# Patient Record
Sex: FEMALE | Race: WHITE | Employment: OTHER | URBAN - METROPOLITAN AREA
[De-identification: names, ages, dates, MRNs, and addresses within clinical notes are randomized per-mention and may not be internally consistent; named-entity substitution may affect disease eponyms.]

---

## 2019-07-15 ENCOUNTER — HOSPITAL ENCOUNTER (OUTPATIENT)
Dept: PHYSICAL THERAPY | Age: 59
Discharge: HOME | End: 2019-07-15
Payer: COMMERCIAL

## 2019-07-15 PROCEDURE — 97162 PT EVAL MOD COMPLEX 30 MIN: CPT

## 2019-07-15 PROCEDURE — 97112 NEUROMUSCULAR REEDUCATION: CPT

## 2019-07-15 NOTE — PROGRESS NOTES
[address] [facility] AT [facility]  [address] [facility] [address] [address],[address], Sondra hopkins, Olga Chamorro  Phone [phone]  Fax [phone] / [address] [address] PHYSICAL THERAPY SERVICES  Patient Name: Kavon Bucio : 1960   Medical   Diagnosis: Gait instability [R26.81]  Vertigo [R42] Treatment Diagnosis: Impaired Balance, Gait Instability   Onset Date: 2018     Referral Source: Johana Ochoa MD [facility]): 7/15/2019   Prior Hospitalization: See medical history Provider #: [id number]   Prior Level of Function: Functionally I   Comorbidities: Asthma, hypertension, hx malignant neoplasm, thyroid disorder, hx of migraines, hx Right ankle surgery with hardware, hx umbilical hernia repair, hearing loss, bilateral carpal tunnel   Medications: Verified on Patient Summary List   The Plan of Care and following information is based on the information from the initial evaluation.   =================================================================================  Assessment / key information:  Patient is 63 yo female who presents to clinic due to gait instability associated with vertigo and Left acoustic neuroma. Patient s/p surgery to remove Left Acoustic Neuroma 19. \"I feel like I am in perpetual buzzed mode\" and \"I get tired so fast\". Patient's primary c/o is that she is unable to walk straight, fascial muscles easily fatigue, she gets tired eating, impaired eyesight, photosensitivity and sensitive to loud noises. Patient reports that it is hard to focus and that it takes longer to focus on an object. Patient works as a  and current difficulties involve speaking clearly and driving to properties. Patient also presents with decreased balance with narrow base of support, likely associated with previous ankle surgeries and hardware.  Clinical findings indicate impaired vestibular occular reflex, impaired balance, weakness and decreased of facial muscles, hearing loss in L ear indicating both facial and vestibulocochlear nerve involvement s/p effects of surgery. Functional impairments include impaired ability to drive, difficulty eating, unsteadiness of gait.      Clinical Findings:    Cervical Rotation: WFL    Observation: scar around L ear, 2 scabs but otherwise healing well    Balance:   Standing Feet Together: EO 30, EC 30   Rhomberg Instep: EO 30 sec bilateral, EC R foot forward 11 sec, L foot forward 27 sec   Rhomberg Bunion: EO R foot forward 30 sec, L foot forward 26 sec, EC R 3 sec, L 5 sec   Sharpened Rhomberg: R foot forward 6 sec, L foot forward 3 sec    Oculomotor Tests:   Occular ROM: WFL Bilateral   Spontaneous Nystagmus: (+) L   Gaze Holding Nystagmus:(-)   Smooth Pursuit: mild/slight catchup nystagmus L   Saccades: (-)   VOR Slow: (-)   VOR Fast: (+)    Weakness of facial muscles, cranial nerve VII    FOTO: 70/100     =================================================================================  Eval Complexity: History: HIGH Complexity :3+ comorbidities / personal factors will impact the outcome/ POC Exam:HIGH Complexity : 4+ Standardized tests and measures addressing body structure, function, activity limitation and / or participation in recreation  Presentation: MEDIUM Complexity : Evolving with changing characteristics  Clinical Decision Making:MEDIUM Complexity : FOTO score of 26-74Overall Complexity:MEDIUM  Problem List: decrease strength, impaired gait/ balance, decrease ADL/ functional abilitiies and decrease activity tolerance   Treatment Plan may include any combination of the following: Therapeutic exercise, Therapeutic activities, Neuromuscular re-education, Physical agent/modality, Gait/balance training, Manual therapy and Patient education  Patient / Family readiness to learn indicated by: asking questions  Persons(s) to be included in education: patient (P)  Barriers to Learning/Limitations: None  Measures taken:    Patient Goal (s): \"get my balance back and my eyesight better. \"    Patient self reported health status: good  Rehabilitation Potential: good   Short Term Goals: To be accomplished in  3  treatments:  1. Patient will be independent with HEP to improve balance and strength in order to improve performance of ADLs.  Long Term Goals: To be accomplished in  8-12  treatments:  1. Patient will be able to perform 1 min of VOR Fast in standing with no dizziness or LOB to indicate improved balance as required to return to driving. 2. Patient will be able to demonstrate symmetric smile, grin, and frown with L side equal to R to indicate improved facial muscles and improved ability to eat. 3. Patient will be able to perform sharpened Rhomberg with EO on foam for 30 sec bilaterally to indicate improved balance and stabilize gait. 4. Patient will report FOTO score of 76/100 to indicate improved functional mobility. Frequency / Duration:   Patient to be seen  2  times per week for 8-12  treatments:  Patient / Caregiver education and instruction: exercises  G-Codes (GP): ELISE  Therapist Signature: Indira Cadet Date: 7/96/7976   Certification Period: NA Time: 9:44 AM   ===========================================================================================  I certify that the above Physical Therapy Services are being furnished while the patient is under my care. I agree with the treatment plan and certify that this therapy is necessary. Physician Signature:        Date:       Time:     Please sign and return to [facility] or you may fax the signed copy to [phone]. Thank you.

## 2019-07-15 NOTE — PROGRESS NOTES
PT GAIT INSTABILITY/VESTIBULAR EVAL AND DAILY TREATMENT NOTE     Patient Name: Guerita Anderson  Date:7/15/2019  : 1960  [x]  Patient  Verified  Payor: Thea Nichols / Plan: [place] [employer]ITATED PT / Product Type: Commerical /    In time:1031  Out time:1118  Total Treatment Time (min): 52  Visit #: 1 of     Treatment Area: Gait instability [R26.81]  Vertigo [R42]    SUBJECTIVE  Pain Level IN: (0-10 scale): 0/10   Pain Level OUT: (0-10 scale) post treatment: 0/10    Any medication changes, allergies to medications, adverse drug reactions, diagnosis change, or new procedure performed?: [x] No    [] Yes (see summary sheet for update)  Subjective functional status/changes:   [] No changes reported  Patient is 63 yo female who presents to clinic due to gait instability associated with vertigo and Left acoustic neuroma. Patient s/p surgery to remove Left Acoustic Neuroma 19. \"I feel like I am in perpetual buzzed mode\" and \"I get tired so fast\". Patient's primary c/o is that she is unable to walk straight, fascial muscles easily fatigue, she gets tired eating, impaired eyesight, photosensitivity and sensitive to loud noises. Patient reports that it is hard to focus and that it takes longer to focus on an object. Patient works as a  and current difficulties involve speaking clearly and driving to properties. OBJECTIVE   22 min Eval    25 min Neuromuscular Re-education:  [x] See flow sheet : also saccades, smooth pursuit   Rationale: improve coordination, improve balance and increase proprioception to improve the patients ability to ambulate normally with decreased risk of falls, and return to driving.         With   [] TE   [] TA   [] neuro   [] other: Patient Education: [x] Review HEP  - VOR, VVI  [] Progressed/Changed HEP based on:   [] positioning   [] body mechanics   [] transfers   [] heat/ice application    [] Graston Education: Explained the effects and benefits of Graston Technique therapy including potential for post treatment soreness and bruising. [] Other:           Objective/Functional Measures:  Cervical Rotation: WFL     Observation: scar around L ear, 2 scabs but otherwise healing well     Balance:                 Standing Feet Together: EO 30, EC 30                 Rhomberg Instep: EO 30 sec bilateral, EC R foot forward 11 sec, L foot forward 27 sec                 Rhomberg Bunion: EO R foot forward 30 sec, L foot forward 26 sec, EC R 3 sec, L 5 sec                 Sharpened Rhomberg: R foot forward 6 sec, L foot forward 3 sec    Oculomotor Tests:                 Occular ROM: WFL Bilateral                 Spontaneous Nystagmus: (+) L                 Gaze Holding Nystagmus:(-)                 Smooth Pursuit: mild/slight catchup nystagmus L                 Saccades: (-)                 VOR Slow: (-)                 VOR Fast: (+)     Weakness of facial muscles, cranial nerve VII    FOTO: 70/100    ASSESSMENT/Changes in Function:  Patient is 63 yo female who presents to clinic due to gait instability associated with vertigo and Left acoustic neuroma. Patient s/p surgery to remove Left Acoustic Neuroma 6/14/19. \"I feel like I am in perpetual buzzed mode\" and \"I get tired so fast\". Patient's primary c/o is that she is unable to walk straight, fascial muscles easily fatigue, she gets tired eating, impaired eyesight, photosensitivity and sensitive to loud noises. Patient reports that it is hard to focus and that it takes longer to focus on an object. Patient works as a  and current difficulties involve speaking clearly and driving to properties. Patient also presents with decreased balance with narrow base of support, likely associated with previous ankle surgeries and hardware.  Clinical findings indicate impaired vestibular occular reflex, impaired balance, weakness and decreased of facial muscles, hearing loss in L ear indicating both facial and vestibulocochlear nerve involvement s/p effects of surgery. Functional impairments include impaired ability to drive, difficulty eating, unsteadiness of gait. Patient will continue to benefit from skilled PT services to modify and progress therapeutic interventions, address functional mobility deficits, address ROM deficits, address strength deficits, analyze and address soft tissue restrictions, analyze and cue movement patterns and address imbalance/dizziness to attain remaining goals. [x]  See Plan of Care  []  See progress note/recertification  []  See Discharge Summary         Progress towards goals / Updated goals: · Short Term Goals: To be accomplished in  3  treatments:  1. Patient will be independent with HEP to improve balance and strength in order to improve performance of ADLs. · Long Term Goals: To be accomplished in  8-12  treatments:  1. Patient will be able to perform 1 min of VOR Fast in standing with no dizziness or LOB to indicate improved balance as required to return to driving. 2. Patient will be able to demonstrate symmetric smile, grin, and frown with L side equal to R to indicate improved facial muscles and improved ability to eat. 3. Patient will be able to perform sharpened Rhomberg with EO on foam for 30 sec bilaterally to indicate improved balance and stabilize gait.   4. Patient will report FOTO score of 76/100 to indicate improved functional mobility    PLAN  []  Upgrade activities as tolerated     []  Continue plan of care  []  Update interventions per flow sheet       []  Discharge due to:_  [x]  Other:_  Initiate plan of care    Leda Cox, PT, DPT   7/15/2019  2:39 PM    Future Appointments   Date Time Provider Seema Guadalupe   7/19/2019  8:30 AM Frank Keyes, PT [facility]   7/22/2019  8:30 AM Fabian Barone PT [facility]   7/25/2019 10:30 AM [facility]   7/30/2019 11:30 AM Frank Keyes, PT [facility]   8/1/2019 10:00 AM Shaneka Dumont [facility]   8/6/2019 10:00 AM Patrick Partida, PT [facility]   8/8/2019 10:00 AM Sheree Wise, PTA [facility]   8/13/2019 10:00 AM Sheree Wise, PTA [facility]   8/15/2019 10:00 AM Sheree Wise, PTA [facility]   8/20/2019 10:00 AM Sheree Wise, PTA [facility]   8/22/2019 10:00 AM Patrick Partida, PT [facility]   8/27/2019 10:00 AM [facility]   8/29/2019 10:00 AM [facility] Partida, [facility]

## 2019-07-19 ENCOUNTER — HOSPITAL ENCOUNTER (OUTPATIENT)
Dept: PHYSICAL THERAPY | Age: 59
Discharge: HOME | End: 2019-07-19
Payer: COMMERCIAL

## 2019-07-19 PROCEDURE — 97112 NEUROMUSCULAR REEDUCATION: CPT

## 2019-07-19 PROCEDURE — 97110 THERAPEUTIC EXERCISES: CPT

## 2019-07-19 NOTE — PROGRESS NOTES
PT DAILY TREATMENT NOTE     Patient Name: Mary Cui  Date:2019  : 1960  [x]  Patient  Verified  Payor: Krista Qureshi / Plan: [address] [place] [address] Rd PT / Product Type: Commerical /    In time:835  Out time:910  Total Treatment Time (min): 35  Visit #: 2 of     Treatment Area: Gait instability [R26.81]  Vertigo [R42]    SUBJECTIVE  Pain Level (0-10 scale): no c/o pain. Reports 4/10 on dizziness scale. Any medication changes, allergies to medications, adverse drug reactions, diagnosis change, or new procedure performed?: [x] No    [] Yes (see summary sheet for update)  Subjective functional status/changes:   [] No changes reported  I was pretty sick when I left here the last time into the next day. Just really dizzy and nauseous. OBJECTIVE  10 min Therapeutic Exercise:  [x] See flow sheet : Issued seated VOR x 1 vertically and horizontally   Rationale: improve balance to improve the patients ability to tolerate daily activities. 25 min Neuromuscular Re-education:  [x]  See flow sheet : See below   Rationale: improve balance and increase proprioception  to improve the patients ability to tolerate daily activities. min Patient Education: [x] Review HEP    [] Progressed/Changed HEP based on:   [] positioning   [] body mechanics   [] transfers   [] heat/ice application        Other Objective/Functional Measures:   Performed 1 minute of VOR x 1 in sitting with cervical rotation of about 25* at comfortable speed, then one minute of VOR x 1 with cervical flexion / extension   Habituation exercises in sitting x 10 times to each side. To the RIght is more challenging than to the Left.    Standing static balance:  Rhomberg on Floor Eyes Open 30 seconds  Rhomberg EO Foam 30 seconds  Rhomberg EC Floor  30 seconds w visible sway  Rhomberg EC Foam  2 seconds x 3 trials    Pain Level (0-10 scale) post treatment: 0/10    ASSESSMENT/Changes in Function: Patient tolerated first treatment session fair with frequent rest breaks required due to c/o dizziness. Patient will continue to benefit from skilled PT services to modify and progress therapeutic interventions and address imbalance/dizziness to attain remaining goals.      []  See Plan of Care  []  See progress note/recertification  []  See Discharge Summary         Progress towards goals / Updated goals:  First session following eval,  reassess goals NV    PLAN  []  Upgrade activities as tolerated     [x]  Continue plan of care  []  Update interventions per flow sheet       []  Discharge due to:_  [x]  Other:  Monitor effects of first treatment KARLIE Mcmanus, PT 7/19/2019  8:42 AM

## 2019-07-22 ENCOUNTER — HOSPITAL ENCOUNTER (OUTPATIENT)
Dept: PHYSICAL THERAPY | Age: 59
Discharge: HOME | End: 2019-07-22
Payer: COMMERCIAL

## 2019-07-22 PROCEDURE — 97112 NEUROMUSCULAR REEDUCATION: CPT | Performed by: PHYSICAL THERAPIST

## 2019-07-22 NOTE — PROGRESS NOTES
PT DAILY TREATMENT NOTE     Patient Name: Grace Jordanian  Date:2019  : 1960  [x]  Patient  Verified  Payor: Liberty Chilel / Plan: [address] PT / Product Type: Commerical /    In time: 838am  Out time: 923  Total Treatment Time (min): 45  Visit #: 3 of     Treatment Area: Gait instability [R26.81]  Vertigo [R42]    SUBJECTIVE  Pain Level (0-10 scale): no c/o pain. Reports 3-4/10 on dizziness scale. Any medication changes, allergies to medications, adverse drug reactions, diagnosis change, or new procedure performed?: [x] No    [] Yes (see summary sheet for update)  Subjective functional status/changes:   [] No changes reported  I always     OBJECTIVE   min Therapeutic Exercise:  [x] See flow sheet : Issued seated VVI x 1 vertically and horizontally   Rationale: improve balance to improve the patients ability to tolerate daily activities. 45 min Neuromuscular Re-education:  [x]  See flow sheet : See below   Rationale: improve balance and increase proprioception  to improve the patients ability to tolerate daily activities. min Patient Education: [x] Review HEP    [] Progressed/Changed HEP based on:   [] positioning   [] body mechanics   [] transfers   [] heat/ice application        Other Objective/Functional Measures:     Standing static balance:  EO foam MSR: (arch) 19sec(L), 30sec (R),  SLS L/R: 15sec, 12sec. Added TR/HR for ankle strength as pt apprehensive for SLS. See flow sheet for balance therex  Foam marching: 10 x 3 sec. With mild dizziness  Able to maintain tandem 30sec/30sec on floor with mild sway. VVI initiated with mild challenge with coordination with mild facial tingling with Vertical HT at fast speed. Pain Level (0-10 scale) post treatment: 0/10    ASSESSMENT/Changes in Function: Patient tolerated therex well with mild increased in sx with VVI vertical head turns.   Patient will continue to benefit from skilled PT services to modify and progress therapeutic interventions and address imbalance/dizziness to attain remaining goals. []  See Plan of Care  []  See progress note/recertification  []  See Discharge Summary         Progress towards goals / Updated goals: · Short Term Goals: To be accomplished in  3  treatments:  1. Patient will be independent with HEP to improve balance and strength in order to improve performance of ADLs. · Long Term Goals: To be accomplished in  8-12  treatments:  1. Patient will be able to perform 1 min of VOR Fast in standing with no dizziness or LOB to indicate improved balance as required to return to driving. 2. Patient will be able to demonstrate symmetric smile, grin, and frown with L side equal to R to indicate improved facial muscles and improved ability to eat. 3. Patient will be able to perform sharpened Rhomberg with EO on foam for 30 sec bilaterally to indicate improved balance and stabilize gait. Progressed to Bunion today  7/22/19  4. Patient will report FOTO score of 76/100 to indicate improved functional mobility.     PLAN  []  Upgrade activities as tolerated     [x]  Continue plan of care  []  Update interventions per flow sheet       []  Discharge due to:_  [x]  Other:     Belle Padilla, PT 7/22/2019  8:42 AM

## 2019-07-25 ENCOUNTER — APPOINTMENT (OUTPATIENT)
Dept: PHYSICAL THERAPY | Age: 59
End: 2019-07-25
Payer: COMMERCIAL

## 2019-07-30 ENCOUNTER — HOSPITAL ENCOUNTER (OUTPATIENT)
Dept: PHYSICAL THERAPY | Age: 59
Discharge: HOME | End: 2019-07-30
Payer: COMMERCIAL

## 2019-07-30 PROCEDURE — 97112 NEUROMUSCULAR REEDUCATION: CPT

## 2019-07-30 PROCEDURE — 97110 THERAPEUTIC EXERCISES: CPT

## 2019-07-30 NOTE — PROGRESS NOTES
PT DAILY TREATMENT NOTE     Patient Name: Albert Carvalho  Date:2019  : 1960  [x]  Patient  Verified  Payor: Tosha Hawthorne / Plan: [address] [place] [address] [address] PT / Product Type: Commerical /    In time:   Out time:   494  Total Treatment Time (min): 45  Visit #: 4 of     Treatment Area: Gait instability [R26.81]  Vertigo [R42]    SUBJECTIVE  Pain Level (0-10 scale): no c/o pain. Reports 3-5/10 on dizziness scale. Any medication changes, allergies to medications, adverse drug reactions, diagnosis change, or new procedure performed?: [x] No    [] Yes (see summary sheet for update)  Subjective functional status/changes:   [] No changes reported  Patient reports compliance with HEP . OBJECTIVE  14/9 min Therapeutic Exercise:  [x] See flow sheet : 5 min NC for warm up. Standing marching on foam, HR/TR. Issued updated HEP   Rationale: improve balance to improve the patients ability to tolerate daily activities. 31 min Neuromuscular Re-education:  [x]  See flow sheet : See below   Rationale: improve balance and increase proprioception  to improve the patients ability to tolerate daily activities. min Patient Education: [x] Review HEP    [] Progressed/Changed HEP based on:   [] positioning   [] body mechanics   [] transfers   [] heat/ice application        Other Objective/Functional Measures:   Rhomberg:   Floor  EC: 30 seconds. MSR: Floor EO   IN 30/30   Tandem  Right 30, Left 30 seconds    MSR: Floor/ EC   IN  Left 5 seconds. Right 30 seconds. BUN Left NT, Right 2    RHOMBERG  FOAM   EO  30 seconds   EC  8 seconds    MSR: FOAM/EO   IN  Left 30,  Right 30 seconds.     BUN  Right 30  Left  30 seconds   GT  Right 30 seconds Left 30 secs   Tandem  Left  18 Seconds   Right  13 Seconds      MSR: FOAM/EC (NT)     Pain Level (0-10 scale) post treatment: 0/10    ASSESSMENT/Changes in Function:   Patient continues to be challenged with vestibular strengthening positions and exercises (Eyes closed and/or standing on foam) as expected due to the nature of patients diagnosis. Patient did demonstrate improved vestibular strength when compared to last session. Patient continues to report increased symptoms with VORx1 horizontally and vertically. Patient did demonstrate increased static balance with new HEP issued (see above). Patient will continue to benefit from skilled PT services to modify and progress therapeutic interventions and address imbalance/dizziness to attain remaining goals. []  See Plan of Care  []  See progress note/recertification  []  See Discharge Summary         Progress towards goals / Updated goals: · Short Term Goals: To be accomplished in  3  treatments:  1. Patient will be independent with HEP to improve balance and strength in order to improve performance of ADLs. RESOLVED Per patient report 7/30/2019 H[facility]     · Long Term Goals: To be accomplished in  8-12  treatments:  1. Patient will be able to perform 1 min of VOR Fast in standing with no dizziness or LOB to indicate improved balance as required to return to driving. Progressing with seated VOR causing increased symptoms. 7/30/2019   2. Patient will be able to demonstrate symmetric smile, grin, and frown with L side equal to R to indicate improved facial muscles and improved ability to eat. 3. Patient will be able to perform sharpened Rhomberg with EO on foam for 30 sec bilaterally to indicate improved balance and stabilize gait. Progressed to Bunion today  7/22/19  4. Patient will report FOTO score of 76/100 to indicate improved functional mobility.     PLAN  []  Upgrade activities as tolerated     [x]  Continue plan of care  []  Update interventions per flow sheet       []  Discharge due to:_  [x]  Other:  Add Facial exercises to address LTG # 2 [address], PT 7/30/2019  8:42 AM

## 2019-08-01 ENCOUNTER — HOSPITAL ENCOUNTER (OUTPATIENT)
Dept: PHYSICAL THERAPY | Age: 59
Discharge: HOME | End: 2019-08-01
Payer: COMMERCIAL

## 2019-08-01 PROCEDURE — 97112 NEUROMUSCULAR REEDUCATION: CPT

## 2019-08-01 NOTE — PROGRESS NOTES
PT DAILY TREATMENT NOTE     Patient Name: Megan Mendoza  Date:2019  : 1960  [x]  Patient  Verified  Payor: Hernandez Shell / [place] [employer] / Product Type: Commerical /    In time: 10:05 Out time:   10:50  Total Treatment Time (min): 45  Visit #: 5 of     Treatment Area: Gait instability [R26.81]  Vertigo [R42]    SUBJECTIVE  Pain Level (0-10 scale): no c/o pain. Reports 4/10 on dizziness scale. Any medication changes, allergies to medications, adverse drug reactions, diagnosis change, or new procedure performed?: [x] No    [] Yes (see summary sheet for update)  Subjective functional status/changes:   [] No changes reported  I am having a really bad today, I don't know why but more off balance then normal        OBJECTIVE   min Therapeutic Exercise:  [x] See flow sheet :   Rationale: improve balance to improve the patients ability to tolerate daily activities. 45 min Neuromuscular Re-education:  [x]  See flow sheet : See below   Rationale: improve balance and increase proprioception  to improve the patients ability to tolerate daily activities. min Patient Education: [x] Review HEP    [] Progressed/Changed HEP based on:   [] positioning   [] body mechanics   [] transfers   [] heat/ice application        Other Objective/Functional Measures:   Rhomberg:   Floor  EC: 30 seconds. EO: 30 seconds  MSR: Floor EO   IN 30/30              BUN 30/30              GT 30/30   Tandem  Right 30, Left 30 seconds    MSR: Floor/ EC   IN  Left 3 seconds. Right 30 seconds. RHOMBERG  FOAM   EO  30 seconds   EC  Unable     MSR: FOAM/EO   IN  Left 30,  Right 30 seconds.     BUN  Right 30  Left  30 seconds   GT  Right 30 seconds Left 30 secs   Tandem  Left  30 Seconds   Right  30 Seconds      VORX1 and VORX2 - firm in [facility] for 1 min each       Pain Level (0-10 scale) post treatment: 4/10 dizziness     ASSESSMENT/Changes in Function:   Patient reported not having a good day and \"off balance\" however was able to perform from [facility] to  on firm and foam with eyes open for 30 sec each to (B) side with some swaying noted however patient was able to self correct and not experience LOB. Did review with patients other factors that maybe causing patient to be experiencing increase in her symptoms of dizziness from diet control - decrease salt intake, proper hydration, proper sleeping and possible sinus/cold issues. Patient will continue to benefit from skilled PT services to modify and progress therapeutic interventions and address imbalance/dizziness to attain remaining goals. [x]  See Plan of Care  []  See progress note/recertification  []  See Discharge Summary         Progress towards goals / Updated goals: · Short Term Goals: To be accomplished in  3  treatments:  1. Patient will be independent with HEP to improve balance and strength in order to improve performance of ADLs. RESOLVED Per patient report 7/30/2019 HLL     · Long Term Goals: To be accomplished in  8-12  treatments:  1. Patient will be able to perform 1 min of VOR Fast in standing with no dizziness or LOB to indicate improved balance as required to return to driving. Progressing with seated VOR causing increased symptoms. 7/30/2019   2. Patient will be able to demonstrate symmetric smile, grin, and frown with L side equal to R to indicate improved facial muscles and improved ability to eat. 3. Patient will be able to perform sharpened Rhomberg with EO on foam for 30 sec bilaterally to indicate improved balance and stabilize gait. Progressed to Bunion today  7/22/19  4. Patient will report FOTO score of 76/100 to indicate improved functional mobility.     PLAN  []  Upgrade activities as tolerated     [x]  Continue plan of care  []  Update interventions per flow sheet       []  Discharge due to:_  [x]  Other:  Did not add fascial exercise this visit - will attempt KARLIE Mitchell, PTA 8/1/2019  8:42 AM

## 2019-08-06 ENCOUNTER — HOSPITAL ENCOUNTER (OUTPATIENT)
Dept: PHYSICAL THERAPY | Age: 59
Discharge: HOME | End: 2019-08-06
Payer: COMMERCIAL

## 2019-08-06 PROCEDURE — 97110 THERAPEUTIC EXERCISES: CPT

## 2019-08-06 PROCEDURE — 97112 NEUROMUSCULAR REEDUCATION: CPT

## 2019-08-06 NOTE — PROGRESS NOTES
PT DAILY TREATMENT NOTE     Patient Name: Bryce Jean  Date:2019  : 1960  [x]  Patient  Verified  Payor: Jeremiah Peters / Plan: [address] [place] [address] Rd PT / Product Type: Commerical /    In time: 1009  Out time:   1045  Total Treatment Time (min):   36  Visit #: 6 of     Treatment Area: Gait instability [R26.81]  Vertigo [R42]    SUBJECTIVE  Pain Level (0-10 scale): no c/o pain. Reports 2/10 on dizziness scale. Any medication changes, allergies to medications, adverse drug reactions, diagnosis change, or new procedure performed?: [x] No    [] Yes (see summary sheet for update)  Subjective functional status/changes:   [x] No changes reported    OBJECTIVE  8 min Therapeutic Exercise:  [x] See flow sheet : Issued updated HEP    Rationale: improve balance to improve the patients ability to tolerate daily activities. 28 min Neuromuscular Re-education:  [x]  See flow sheet : See below   Rationale: improve balance and increase proprioception  to improve the patients ability to tolerate daily activities. min Patient Education: [x] Review HEP    [] Progressed/Changed HEP based on:   [] positioning   [] body mechanics   [] transfers   [] heat/ice application        Other Objective/Functional Measures:     Rhomberg:   Floor  EC: 30 seconds. EO: 30 seconds  MSR: Floor EO   Tandem  Right 30, Left 30 seconds    MSR: Floor/ EC   IN 30/30              BUN Left 5 Right/30              GT Right 9    RHOMBERG  FOAM   EO  30 seconds   EC  27    MSR: FOAM/EO   Tandem  Left  30 Seconds   Right  24 Seconds  MSR: Foam /EC   Instep Left 5, Right 2. Laura George and Hortencia Espana - firm in RHOM for 1 min each   standing habituation exercises in Rhomberg Stance on Floor with no c/o increased dizziness. X 10 B.     Pain Level (0-10 scale) post treatment: 2/10 dizziness     ASSESSMENT/Changes in Function:   Patient tolerated all neuromuscular re education and therex well with no reports of increased dizziness after intervention   Patient will continue to benefit from skilled PT services to modify and progress therapeutic interventions and address imbalance/dizziness to attain remaining goals. [x]  See Plan of Care  []  See progress note/recertification  []  See Discharge Summary         Progress towards goals / Updated goals: · Short Term Goals: To be accomplished in  3  treatments:  1. Patient will be independent with HEP to improve balance and strength in order to improve performance of ADLs. RESOLVED Per patient report 7/30/2019 HLL     · Long Term Goals: To be accomplished in  8-12  treatments:  1. Patient will be able to perform 1 min of VOR Fast in standing with no dizziness or LOB to indicate improved balance as required to return to driving. Progressing with seated VOR causing increased symptoms. 7/30/2019   2. Patient will be able to demonstrate symmetric smile, grin, and frown with L side equal to R to indicate improved facial muscles and improved ability to eat. 3. Patient will be able to perform sharpened Rhomberg with EO on foam for 30 sec bilaterally to indicate improved balance and stabilize gait. Progressed to Bunion today  7/22/19  4. Patient will report FOTO score of 76/100 to indicate improved functional mobility.     PLAN  []  Upgrade activities as tolerated     [x]  Continue plan of care  []  Update interventions per flow sheet       []  Discharge due to:_  []  Other:        Toan Luong, PT 8/6/2019  8:42 AM

## 2019-08-08 ENCOUNTER — HOSPITAL ENCOUNTER (OUTPATIENT)
Dept: PHYSICAL THERAPY | Age: 59
Discharge: HOME | End: 2019-08-08
Payer: COMMERCIAL

## 2019-08-08 PROCEDURE — 97535 SELF CARE MNGMENT TRAINING: CPT | Performed by: PHYSICAL THERAPIST

## 2019-08-08 PROCEDURE — 97110 THERAPEUTIC EXERCISES: CPT | Performed by: PHYSICAL THERAPIST

## 2019-08-08 NOTE — PROGRESS NOTES
PT DAILY TREATMENT NOTE     Patient Name: Aidee Cline  Date:2019  : 1960  [x]  Patient  Verified  Payor: Troy Reinoso / Plan: [address] PT / Product Type: Commerical /    In time: 10:15 Out time:   1051pm  Total Treatment Time (min): 36min  Visit #: 7 of     Treatment Area: Gait instability [R26.81]  Vertigo [R42]    SUBJECTIVE  Pain Level (0-10 scale): no c/o pain. Reports 2 on dizziness scale. Any medication changes, allergies to medications, adverse drug reactions, diagnosis change, or new procedure performed?: [x] No    [] Yes (see summary sheet for update)  Subjective functional status/changes:   [] No changes reported  I have been having increased ankle pain since doing therapy. OBJECTIVE   min Therapeutic Exercise:  [x] See flow sheet :   Rationale: improve balance to improve the patients ability to tolerate daily activities. 28 min Neuromuscular Re-education:  [x]  See flow sheet : See below   Rationale: improve balance and increase proprioception  to improve the patients ability to tolerate daily activities. 8 min Patient Education: [x] Review HEP    [] Progressed/Changed HEP based on: TrPR of SCM mm, TMJ mm (masseter), education on Fwd head and AROM into c/s rot several x per day. [] positioning   [] body mechanics   [] transfers   [] heat/ice application        Other Objective/Functional Measures:   Rhomberg:   Floor  EC: 30 seconds. EO: 30 seconds  MSR: Floor EO   IN 30/30              BUN 30/30              GT 30/30   Tandem  Right 30, Left 30 seconds    MSR: Floor/ EC   IN  Left 15 seconds. Right 30 seconds. RHOMBERG  FOAM   EO  30 seconds   EC  Unable     MSR: FOAM/EO   IN  Left 30,  Right 30 seconds.     IN EC:22sec, R:    Tandem  Left  30 Seconds   Right  30 Seconds    VORX1 and VORX2 - firm in [facility] for 1 min each        With increased shooting pain into L temple of head  Habituation eye exercise 10x    Pain Level (0-10 scale) post treatment: 1-2/10 dizziness     ASSESSMENT/Changes in Function:   Added TrP R to HEP of SCM mm as pt gets a shooting pain into L temple. Pt c/o pain and TTP into L masseter mm as well. Pt continues to be significantly challenged with the EC on foam therex. Patient will continue to benefit from skilled PT services to modify and progress therapeutic interventions and address imbalance/dizziness to attain remaining goals. [x]  See Plan of Care  []  See progress note/recertification  []  See Discharge Summary         Progress towards goals / Updated goals: · Short Term Goals: To be accomplished in  3  treatments:  1. Patient will be independent with HEP to improve balance and strength in order to improve performance of ADLs. RESOLVED Per patient report 7/30/2019 HLL     · Long Term Goals: To be accomplished in  8-12  treatments:  1. Patient will be able to perform 1 min of VOR Fast in standing with no dizziness or LOB to indicate improved balance as required to return to driving. Progressing with seated VOR causing increased symptoms. 7/30/2019   2. Patient will be able to demonstrate symmetric smile, grin, and frown with L side equal to R to indicate improved facial muscles and improved ability to eat. 3. Patient will be able to perform sharpened Rhomberg with EO on foam for 30 sec bilaterally to indicate improved balance and stabilize gait. Progressed to Bunion today  7/22/19  4. Patient will report FOTO score of 76/100 to indicate improved functional mobility. PLAN  []  Upgrade activities as tolerated     [x]  Continue plan of care  []  Update interventions per flow sheet       []  Discharge due to:_  [x]  Other:  Added TrP R to HEP of SCM mm as pt gets a shooting pain into L temple. Pt c/o pain and TTP into L masseter mm as well.       Marisela Jimenez, PT 8/8/2019  8:42 AM

## 2019-08-13 ENCOUNTER — APPOINTMENT (OUTPATIENT)
Dept: PHYSICAL THERAPY | Age: 59
End: 2019-08-13
Payer: COMMERCIAL

## 2019-08-15 ENCOUNTER — HOSPITAL ENCOUNTER (OUTPATIENT)
Dept: PHYSICAL THERAPY | Age: 59
Discharge: HOME | End: 2019-08-15
Payer: COMMERCIAL

## 2019-08-15 PROCEDURE — 97112 NEUROMUSCULAR REEDUCATION: CPT

## 2019-08-15 PROCEDURE — 97530 THERAPEUTIC ACTIVITIES: CPT

## 2019-08-15 NOTE — PROGRESS NOTES
PT DAILY TREATMENT NOTE     Patient Name: Ladona Merlin  Date:8/15/2019  : 1960  [x]  Patient  Verified  Payor: Yue Overall / Plan: [address] PT / Product Type: Commerical /    In time: 10:10 Out time:   11:50  Total Treatment Time (min): 40  Visit #: 8 of     Treatment Area: Gait instability [R26.81]  Vertigo [R42]    SUBJECTIVE  Pain Level (0-10 scale): no c/o pain. Reports 2 on dizziness scale. Any medication changes, allergies to medications, adverse drug reactions, diagnosis change, or new procedure performed?: [x] No    [] Yes (see summary sheet for update)  Subjective functional status/changes:   [] No changes reported   I am overall 85% improved since starting therapy - I would say my biggest problem is if I have to bend over and then turn my head side to side I can get a bout of dizziness so that is why I can't do any yard work it wipes me out     OBJECTIVE   min Therapeutic Exercise:  [x] See flow sheet :   Rationale: improve balance to improve the patients ability to tolerate daily activities. 35 min Neuromuscular Re-education:  [x]  See flow sheet : See below   Rationale: improve balance and increase proprioception  to improve the patients ability to tolerate daily activities.            15 min Patient Education: [] Review HEP    [x] Progressed/Changed HEP based on: .update HEP and REASSESSMENT AND FOTO    [] positioning   [] body mechanics   [] transfers   [] heat/ice application        Other Objective/Functional Measures:   FOTO score is 79/100 was 70/100 on initial evaluation        MSR: Floor EC   IN 30/30              BUN 11 (L) and 11 (R)           MSR: FOAM/EC   IN  24 (L) and 6 (R)      VORX1   Heel to Great toe - firm- 1 min to each - up and down and side to side  VORX2  Heel to Instep - firm - 1 min to each - up/down and side to side      Pain Level (0-10 scale) post treatment: 2/10 dizziness     ASSESSMENT/Changes in Function:   Gave updated HEP for patient to complete daily and follow up in 2 weeks -    Patient will continue to benefit from skilled PT services to modify and progress therapeutic interventions and address imbalance/dizziness to attain remaining goals. []  See Plan of Care  [x]  See progress note/recertification  []  See Discharge Summary         Progress towards goals / Updated goals: · Short Term Goals: To be accomplished in  3  treatments:  1. Patient will be independent with HEP to improve balance and strength in order to improve performance of ADLs. RESOLVED Per patient report 7/30/2019 HLL     · Long Term Goals: To be accomplished in  8-12  treatments:  1. Patient will be able to perform 1 min of VOR Fast in standing with no dizziness or LOB to indicate improved balance as required to return to driving. MET 8/15/2019  2. Patient will be able to demonstrate symmetric smile, grin, and frown with L side equal to R to indicate improved facial muscles and improved ability to eat. PROGRESSING - APPROX 80% FULL SMILE/GRIN AND FROWN ON THE (L) SIDE OF MOUTH 8/15/2019  3. Patient will be able to perform sharpened Rhomberg with EO on foam for 30 sec bilaterally to indicate improved balance and stabilize gait. MET 8/15/2019  4. Patient will report FOTO score of 76/100 to indicate improved functional mobility. MET 8/15/2019  PLAN  []  Upgrade activities as tolerated     [x]  Continue plan of care  []  Update interventions per flow sheet       []  Discharge due to:_  []  Other: SUNITA Storey 8/15/2019  8:42 AM

## 2019-08-20 ENCOUNTER — APPOINTMENT (OUTPATIENT)
Dept: PHYSICAL THERAPY | Age: 59
End: 2019-08-20
Payer: COMMERCIAL

## 2019-08-22 ENCOUNTER — APPOINTMENT (OUTPATIENT)
Dept: PHYSICAL THERAPY | Age: 59
End: 2019-08-22
Payer: COMMERCIAL

## 2019-08-27 ENCOUNTER — HOSPITAL ENCOUNTER (OUTPATIENT)
Dept: PHYSICAL THERAPY | Age: 59
Discharge: HOME | End: 2019-08-27
Payer: COMMERCIAL

## 2019-08-27 PROCEDURE — 97112 NEUROMUSCULAR REEDUCATION: CPT

## 2019-08-27 PROCEDURE — 97110 THERAPEUTIC EXERCISES: CPT

## 2019-08-27 NOTE — PROGRESS NOTES
[address] [facility]  [address] [facility] [address] [place] [address],[address], [place], [address]  Phone [phone]  Fax [phone]  DISCHARGE NOTE  Patient Name: Devante Lugo : 1960   Treatment/Medical Diagnosis: Gait instability [R26.81]  Vertigo [R42]   Referral Source: Anderson Aranda MD     Date of Initial Visit: 7/15/2019 Attended Visits: 9 Missed Visits: 1     SUMMARY OF TREATMENT  Patient has received physical therapy for gait instability associated with vertigo and Left acoustic neuroma. Patient s/p surgery to remove Left Acoustic Neuroma 19 since 7/15/2019. Treatment has included neuromuscular exercises and activities to improve overall balance with gait and ADLs    CURRENT STATUS  Patient has completed 9 visits  Patient reports overall 90% improvement since beginning therapy  Patient has progressed with all balance exercises from Rho to [facility] with eyes open and closed on firm and compliant surfaces up until this point:  MSR: Floor EC              BUN 24 (L) and 30 (R)                Patient is I and compliant with HEP to include vestibular strengthening and facial strengthening exercises. Patient is DC this session with HEP to continue to address vestibular weakness and facial asymmetry. RECOMMENDATIONS  DC PT today with HEP to continue due to Goals Met/ Plateau in progress. If you have any questions/comments please contact us directly at [phone]. Thank you for allowing us to assist in the care of your patient. Therapist Signature: Oscar Gilliam PT, DPT  Date: 2019     Time: 2:19 PM   NOTE TO PHYSICIAN:  PLEASE COMPLETE THE ORDERS BELOW AND FAX TO   [facility]: [phone].   If you are unable to process this request in 24 hours please contact our office: [phone].    ___ I have read the above report and request that my patient continue as recommended.   ___ I have read the above report and request that my patient continue therapy with the following changes/special instructions:_________________________________________________________   ___ I have read the above report and request that my patient be discharged from therapy.      Physician Signature:        Date:       Time:

## 2019-08-27 NOTE — PROGRESS NOTES
PT DAILY TREATMENT NOTE 8    Patient Name: Mayur Luna  Date:2019  : 1960  [x]  Patient  Verified  Payor: Chente Shields / Plan: [address] [place] [address] Rd PT / Product Type: Commerical /    In time: 1020  Out time:  1045  Total Treatment Time (min): 25  Visit #: 1 of 2 to 4     Treatment Area: Gait instability [R26.81]  Vertigo [R42]    SUBJECTIVE  Pain Level (0-10 scale): Reports 5  on dizziness scale. Any medication changes, allergies to medications, adverse drug reactions, diagnosis change, or new procedure performed?: [x] No    [] Yes (see summary sheet for update)  Subjective functional status/changes:   [] No changes reported  Patient reports that she followed up with referring MD who would see her again in 6 months. REports compliance with HEP and frustration that it isn't progressing faster. OBJECTIVE  8 min Therapeutic Exercise:  [x] See flow sheet : Review of progression of therex based on performance   Rationale: improve balance to improve the patients ability to tolerate daily activities. 17 min Neuromuscular Re-education:  [x]  See flow sheet : See below   Rationale: improve balance and increase proprioception  to improve the patients ability to tolerate daily activities.             min Patient Education: [] Review HEP    [x] Progressed/Changed HEP based on:   [] positioning   [] body mechanics   [] transfers   [] heat/ice application        Other Objective/Functional Measures:  MSR: Floor EC              BUN 24 (L) and 30 (R)   Great Toe LEft 4 Right 21 seconds           MSR: FOAM/EC   Rhomberg 20 seconds   IN  2 (L) and  3  (R)      VORX1   Heel to Great toe - firm- 1 min to each - up and down and side to side  VORX2  Heel to Instep - firm - 1 min to each - up/down and side to side      Pain Level (0-10 scale) post treatment:  3/10 dizziness     ASSESSMENT/Changes in Function:   Patient presents with overall understanding of progression of level of challenge with exercises (EO vs EC, Floor vs Foam and Varied foot positions). Patient continues to be challenged by vestibular strengthening exercises. Patient DC from PT at this time due to goals met of I with HEP to continue to progress with vestibular strengthening program as tolerated. []  See Plan of Care  [x]  See progress note/recertification  []  See Discharge Summary         Progress towards goals / Updated goals:   Long-term Goals: 4 weeks  1. Patient will be able to demonstrate symmetric smile, grin, and frown with L side equal to R to indicate improved facial muscles and improved ability to eat. Progressing with approximately 80% of smile/grin on Left side of face. 8/27/2019      2. Patient will demonstrate I with progressed HEP for vestibular strength and I understanding of progression of EO/EC, Floor vs Foam and varied foot positions. RESOLVED 8/27/2019   PLAN    []  Upgrade activities as tolerated     []  Continue plan of care  []  Update interventions per flow sheet       [x]  Discharge due to:_Goals met/ plateau in progress.    []  Other:     Miles Barnes, PT 8/27/2019  8:42 AM

## 2019-08-29 ENCOUNTER — APPOINTMENT (OUTPATIENT)
Dept: PHYSICAL THERAPY | Age: 59
End: 2019-08-29
Payer: COMMERCIAL

## 2019-09-10 ENCOUNTER — APPOINTMENT (OUTPATIENT)
Dept: PHYSICAL THERAPY | Age: 59
End: 2019-09-10

## 2019-09-12 ENCOUNTER — APPOINTMENT (OUTPATIENT)
Dept: PHYSICAL THERAPY | Age: 59
End: 2019-09-12

## 2020-02-11 ENCOUNTER — IMPORTED ENCOUNTER (OUTPATIENT)
Dept: URBAN - METROPOLITAN AREA CLINIC 1 | Facility: CLINIC | Age: 60
End: 2020-02-11

## 2020-02-11 PROBLEM — H02.402: Noted: 2020-02-11

## 2020-02-11 PROCEDURE — 92004 COMPRE OPH EXAM NEW PT 1/>: CPT

## 2020-02-11 NOTE — PATIENT DISCUSSION
1.  Ptosis OS - upper lid. Quite variable from 1 - 4 mm with good Levator function and worse toward  end of day. No other muscles involved. No significant change in refraction. Discussed with pt and . No treatment at this time. 2. Return for an appointment in 1 year for 30 with Dr. Sylvie Disla.

## 2022-04-02 ASSESSMENT — VISUAL ACUITY
OD_SC: 20/20
OD_CC: J1+
OS_SC: 20/20
OS_CC: J1+

## 2022-04-02 ASSESSMENT — TONOMETRY
OD_IOP_MMHG: 18
OS_IOP_MMHG: 18